# Patient Record
Sex: MALE | Race: WHITE | NOT HISPANIC OR LATINO | Employment: OTHER | URBAN - METROPOLITAN AREA
[De-identification: names, ages, dates, MRNs, and addresses within clinical notes are randomized per-mention and may not be internally consistent; named-entity substitution may affect disease eponyms.]

---

## 2018-05-25 NOTE — PATIENT DISCUSSION
RETINA IS ATTACHED OU: SYMPTOMATIC PVD OD; NO ERM; NO LATTICE DEGENERATION; NO HOLES OR TEARS SEEN ON DILATED EXAM TODAY.  RETINAL DETACHMENT SIGNS AND SYMPTOMS REVIEWED

## 2018-06-27 NOTE — PATIENT DISCUSSION
RETINA IS ATTACHED OU: PVD OD; NO HOLES OR TEARS SEEN ON DILATED EXAM TODAY. RETINAL DETACHMENT SIGNS AND SYMPTOMS REVIEWED.

## 2021-10-04 ENCOUNTER — NEW REFERRAL (OUTPATIENT)
Dept: URBAN - METROPOLITAN AREA CLINIC 33 | Facility: CLINIC | Age: 81
End: 2021-10-04

## 2021-10-04 VITALS
HEIGHT: 69 IN | WEIGHT: 151 LBS | SYSTOLIC BLOOD PRESSURE: 166 MMHG | HEART RATE: 55 BPM | BODY MASS INDEX: 22.36 KG/M2 | DIASTOLIC BLOOD PRESSURE: 84 MMHG

## 2021-10-04 DIAGNOSIS — H04.123: ICD-10-CM

## 2021-10-04 DIAGNOSIS — H35.363: ICD-10-CM

## 2021-10-04 DIAGNOSIS — H35.371: ICD-10-CM

## 2021-10-04 DIAGNOSIS — H35.372: ICD-10-CM

## 2021-10-04 PROCEDURE — 92004 COMPRE OPH EXAM NEW PT 1/>: CPT

## 2021-10-04 PROCEDURE — 92134 CPTRZ OPH DX IMG PST SGM RTA: CPT

## 2021-10-04 PROCEDURE — 92235 FLUORESCEIN ANGRPH MLTIFRAME: CPT

## 2021-10-04 ASSESSMENT — VISUAL ACUITY
OS_PH: 20/25
OD_SC: 20/40
OD_CC: 20/30-2
OS_SC: 20/30-1
OD_PH: 20/25-2
OS_CC: 20/25-2

## 2021-10-04 ASSESSMENT — TONOMETRY
OD_IOP_MMHG: 19
OS_IOP_MMHG: 18

## 2022-03-15 ENCOUNTER — FOLLOW UP (OUTPATIENT)
Dept: URBAN - METROPOLITAN AREA CLINIC 33 | Facility: CLINIC | Age: 82
End: 2022-03-15

## 2022-03-15 VITALS — HEIGHT: 69 IN | BODY MASS INDEX: 22.22 KG/M2 | WEIGHT: 150 LBS

## 2022-03-15 DIAGNOSIS — H35.372: ICD-10-CM

## 2022-03-15 DIAGNOSIS — H35.363: ICD-10-CM

## 2022-03-15 DIAGNOSIS — H35.371: ICD-10-CM

## 2022-03-15 PROCEDURE — 92014 COMPRE OPH EXAM EST PT 1/>: CPT

## 2022-03-15 PROCEDURE — 92134 CPTRZ OPH DX IMG PST SGM RTA: CPT

## 2022-03-15 PROCEDURE — 92250 FUNDUS PHOTOGRAPHY W/I&R: CPT

## 2022-03-15 ASSESSMENT — TONOMETRY
OD_IOP_MMHG: 13
OS_IOP_MMHG: 12

## 2022-03-15 ASSESSMENT — VISUAL ACUITY
OS_SC: 20/20-2
OD_SC: 20/25-2

## 2022-09-27 ENCOUNTER — FOLLOW UP (OUTPATIENT)
Dept: URBAN - METROPOLITAN AREA CLINIC 33 | Facility: CLINIC | Age: 82
End: 2022-09-27

## 2022-09-27 VITALS
BODY MASS INDEX: 23.25 KG/M2 | WEIGHT: 157 LBS | HEART RATE: 50 BPM | HEIGHT: 69 IN | SYSTOLIC BLOOD PRESSURE: 134 MMHG | DIASTOLIC BLOOD PRESSURE: 72 MMHG

## 2022-09-27 DIAGNOSIS — H04.123: ICD-10-CM

## 2022-09-27 DIAGNOSIS — H35.363: ICD-10-CM

## 2022-09-27 DIAGNOSIS — H02.831: ICD-10-CM

## 2022-09-27 DIAGNOSIS — H35.373: ICD-10-CM

## 2022-09-27 DIAGNOSIS — H02.834: ICD-10-CM

## 2022-09-27 PROCEDURE — 92250 FUNDUS PHOTOGRAPHY W/I&R: CPT

## 2022-09-27 PROCEDURE — 92134 CPTRZ OPH DX IMG PST SGM RTA: CPT

## 2022-09-27 PROCEDURE — 92014 COMPRE OPH EXAM EST PT 1/>: CPT

## 2022-09-27 ASSESSMENT — VISUAL ACUITY
OS_SC: 20/25+2
OD_SC: 20/30+2

## 2022-09-27 ASSESSMENT — TONOMETRY
OD_IOP_MMHG: 16
OS_IOP_MMHG: 14

## 2023-05-30 ENCOUNTER — FOLLOW UP (OUTPATIENT)
Dept: URBAN - METROPOLITAN AREA CLINIC 33 | Facility: CLINIC | Age: 83
End: 2023-05-30

## 2023-05-30 VITALS — WEIGHT: 170 LBS | HEIGHT: 69 IN | BODY MASS INDEX: 25.18 KG/M2

## 2023-05-30 DIAGNOSIS — H04.123: ICD-10-CM

## 2023-05-30 DIAGNOSIS — H35.363: ICD-10-CM

## 2023-05-30 DIAGNOSIS — H02.831: ICD-10-CM

## 2023-05-30 DIAGNOSIS — H02.834: ICD-10-CM

## 2023-05-30 DIAGNOSIS — H35.373: ICD-10-CM

## 2023-05-30 PROCEDURE — 92250 FUNDUS PHOTOGRAPHY W/I&R: CPT

## 2023-05-30 PROCEDURE — 92014 COMPRE OPH EXAM EST PT 1/>: CPT

## 2023-05-30 PROCEDURE — 92134 CPTRZ OPH DX IMG PST SGM RTA: CPT

## 2023-05-30 ASSESSMENT — VISUAL ACUITY
OS_SC: 20/30-2
OD_SC: 20/25-2

## 2023-05-30 ASSESSMENT — TONOMETRY
OS_IOP_MMHG: 13
OD_IOP_MMHG: 15

## 2024-06-18 ENCOUNTER — FOLLOW UP (OUTPATIENT)
Dept: URBAN - METROPOLITAN AREA CLINIC 33 | Facility: CLINIC | Age: 84
End: 2024-06-18

## 2024-06-18 VITALS — HEIGHT: 69 IN | BODY MASS INDEX: 25.77 KG/M2 | WEIGHT: 174 LBS

## 2024-06-18 DIAGNOSIS — H04.123: ICD-10-CM

## 2024-06-18 DIAGNOSIS — H35.363: ICD-10-CM

## 2024-06-18 DIAGNOSIS — H02.834: ICD-10-CM

## 2024-06-18 DIAGNOSIS — H02.831: ICD-10-CM

## 2024-06-18 DIAGNOSIS — H35.373: ICD-10-CM

## 2024-06-18 PROCEDURE — 92134 CPTRZ OPH DX IMG PST SGM RTA: CPT

## 2024-06-18 PROCEDURE — 92014 COMPRE OPH EXAM EST PT 1/>: CPT

## 2024-06-18 ASSESSMENT — TONOMETRY
OD_IOP_MMHG: 12
OS_IOP_MMHG: 11

## 2024-06-18 ASSESSMENT — VISUAL ACUITY
OD_SC: 20/25-1
OS_SC: 20/30+2

## 2025-06-26 ENCOUNTER — FOLLOW UP (OUTPATIENT)
Age: 85
End: 2025-06-26

## 2025-06-26 VITALS
WEIGHT: 171 LBS | BODY MASS INDEX: 25.33 KG/M2 | DIASTOLIC BLOOD PRESSURE: 83 MMHG | HEART RATE: 53 BPM | SYSTOLIC BLOOD PRESSURE: 141 MMHG | HEIGHT: 69 IN

## 2025-06-26 DIAGNOSIS — H02.831: ICD-10-CM

## 2025-06-26 DIAGNOSIS — H04.123: ICD-10-CM

## 2025-06-26 DIAGNOSIS — H02.834: ICD-10-CM

## 2025-06-26 DIAGNOSIS — H35.373: ICD-10-CM

## 2025-06-26 DIAGNOSIS — H35.363: ICD-10-CM

## 2025-06-26 PROCEDURE — 92014 COMPRE OPH EXAM EST PT 1/>: CPT

## 2025-06-26 PROCEDURE — 92134 CPTRZ OPH DX IMG PST SGM RTA: CPT
